# Patient Record
Sex: MALE | Race: WHITE
[De-identification: names, ages, dates, MRNs, and addresses within clinical notes are randomized per-mention and may not be internally consistent; named-entity substitution may affect disease eponyms.]

---

## 2018-02-27 ENCOUNTER — HOSPITAL ENCOUNTER (OUTPATIENT)
Dept: HOSPITAL 68 - STS | Age: 58
End: 2018-02-27
Payer: COMMERCIAL

## 2018-02-27 VITALS — HEIGHT: 72 IN | WEIGHT: 255.25 LBS | BODY MASS INDEX: 34.57 KG/M2

## 2018-02-27 DIAGNOSIS — Z79.01: ICD-10-CM

## 2018-02-27 DIAGNOSIS — Z79.84: ICD-10-CM

## 2018-02-27 DIAGNOSIS — I10: ICD-10-CM

## 2018-02-27 DIAGNOSIS — Z85.528: ICD-10-CM

## 2018-02-27 DIAGNOSIS — E11.8: ICD-10-CM

## 2018-02-27 DIAGNOSIS — H25.9: Primary | ICD-10-CM

## 2018-02-27 PROCEDURE — V2632 POST CHMBR INTRAOCULAR LENS: HCPCS

## 2018-02-27 NOTE — OPERATIVE REPORT
Operative/Inv Procedure Report
Surgery Date: 02/27/18
Name of Procedure:
Cataract extraction lens implantation left eye
Pre-Operative Diagnosis:
Age-related cataract left eye 20/50 vision
Post-Operative Diagnosis:
Same
Estimated Blood Loss: none
Surgeon/Assistant:
Darrel CORBETT,Larry CARTER
 
Anesthesia: local monitored anesthesi
Complications:
None
 
Operative/Procedure Note
Note:
The patient was brought to the operating room standard monitoring equipment was 
attached the patient was prepped and draped in the usual fashion for intraocular
surgery.  A lid speculum was placed to retract the lids.  The case was begun by 
making 2 partial-thickness corneal relaxing incisions at 75.  A temporal 
incision with a 2.4 mm keratome.  The eye was stabilized with a Mace ring 
during this incision.  1 mL of non-preserved lidocaine was introduced into the 
anterior chamber to provide anesthesia.  The anterior chamber was then filled 
and deepened with viscoelastic.  A curvilinear capsulorrhexis was achieved using
a 30-gauge needle and is a cystotome and capsulorrhexis was finished using a 
Utrata forceps.  A second or paracentesis incision was made temporally with a 1 
mm MVR blade.  The lens was then hydrodissected with balanced salt solution and 
found to be rotatable.  The lens was emulsified using phacoemulsification and a 
modified four-quadrant cracking technique.  The residual cortical material was 
removed using automated irrigation and aspiration and as much of the anterior 
capsular rim was cleaned as well as possible.  The posterior capsule was cleaned
first with the automated machine on a low setting and then manually with a Paul
squeegee.  The capsular bag was deepened with viscoelastic.  The lens a Akreos 
AO60 18.5  Diopter placed into the bag under direct visualization and rotated so
that the haptics were at 12 and 6:00.  Viscoelastic was then removed from the 
eye by flushing it out and then by automated irrigation and aspiration.  The eye
was pressurized to a normal tone.  1/10 of a cc of vancomycin solution was 
introduced into the anterior chamber to provide antibiotic prophylaxis.  The 
wounds were sealed by hydrating the stroma adjacent to them and the eye was left
at a proper tone after the wounds were checked and found not to be leaking.  The
lid speculum was removed from the orbit.  Antibiotic and steroid drops were 
placed on the eye and then the eye was shielded.  Monitoring equipment was 
removed from the patient and the patient was removed from the operative suite to
the holding area.  The patient tolerated the procedure well and will be seen in 
the office tomorrow.

## 2018-03-27 ENCOUNTER — HOSPITAL ENCOUNTER (OUTPATIENT)
Dept: HOSPITAL 68 - STS | Age: 58
End: 2018-03-27
Payer: COMMERCIAL

## 2018-03-27 VITALS — BODY MASS INDEX: 34.54 KG/M2 | WEIGHT: 255 LBS | HEIGHT: 72 IN

## 2018-03-27 DIAGNOSIS — Z79.84: ICD-10-CM

## 2018-03-27 DIAGNOSIS — I48.91: ICD-10-CM

## 2018-03-27 DIAGNOSIS — H25.9: Primary | ICD-10-CM

## 2018-03-27 DIAGNOSIS — Z79.01: ICD-10-CM

## 2018-03-27 DIAGNOSIS — E11.9: ICD-10-CM

## 2018-03-27 DIAGNOSIS — I10: ICD-10-CM

## 2018-03-27 PROCEDURE — V2632 POST CHMBR INTRAOCULAR LENS: HCPCS

## 2018-03-27 NOTE — OPERATIVE REPORT
Operative/Inv Procedure Report
Surgery Date: 03/27/18
Name of Procedure:
Cataract extraction lens implantation right eye
Pre-Operative Diagnosis:
Age-related cataract right eye 20/30 vision 20/60 glare vision
Post-Operative Diagnosis:
Same
Estimated Blood Loss: none
Surgeon/Assistant:
Darrel CORBETT,Larry CARTER
 
Anesthesia: local monitored anesthesi
Complications:
None
 
Operative/Procedure Note
Note:
The patient was brought to the operating room standard monitoring equipment was 
attached the patient was prepped and draped in the usual fashion for intraocular
surgery.  A lid speculum was placed to retract the lids.  The case was begun by 
making [2] partial-thickness corneal relaxing [incisions] at [90].  A temporal 
incision with a 2.4 mm keratome.  The eye was stabilized with a Mace ring 
during this incision.  1 mL of non-preserved lidocaine was introduced into the 
anterior chamber to provide anesthesia.  The anterior chamber was then filled 
and deepened with viscoelastic.  A curvilinear capsulorrhexis was achieved using
a 30-gauge needle and is a cystotome and capsulorrhexis was finished using a 
Utrata forceps.  A second or paracentesis incision was made temporally with a 1 
mm MVR blade.  The lens was then hydrodissected with balanced salt solution and 
found to be rotatable.  The lens was emulsified using phacoemulsification and a 
modified four-quadrant cracking technique.  The residual cortical material was 
removed using automated irrigation and aspiration and as much of the anterior 
capsular rim was cleaned as well as possible.  The posterior capsule was cleaned
first with the automated machine on a low setting and then manually with a Paul
squeegee.  The capsular bag was deepened with viscoelastic.  The lens a Akreos 
AO60 18.0  Diopter placed into the bag under direct visualization and rotated so
that the haptics were at 12 and 6:00.  Viscoelastic was then removed from the 
eye by flushing it out and then by automated irrigation and aspiration.  The eye
was pressurized to a normal tone.  1/10 of a cc of cefuroxime  solution was 
introduced into the anterior chamber to provide antibiotic prophylaxis.  The 
wounds were sealed by hydrating the stroma adjacent to them and the eye was left
at a proper tone after the wounds were checked and found not to be leaking.  The
lid speculum was removed from the orbit.  Antibiotic and steroid drops were 
placed on the eye and then the eye was shielded.  Monitoring equipment was 
removed from the patient and the patient was removed from the operative suite to
the holding area.  The patient tolerated the procedure well and will be seen in 
the office tomorrow.

## 2018-05-30 ENCOUNTER — HOSPITAL ENCOUNTER (EMERGENCY)
Dept: HOSPITAL 68 - ERH | Age: 58
End: 2018-05-30
Payer: COMMERCIAL

## 2018-05-30 VITALS — BODY MASS INDEX: 34.4 KG/M2 | WEIGHT: 254 LBS | HEIGHT: 72 IN

## 2018-05-30 VITALS — SYSTOLIC BLOOD PRESSURE: 178 MMHG | DIASTOLIC BLOOD PRESSURE: 80 MMHG
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
## 2018-05-30 DIAGNOSIS — Z79.01: ICD-10-CM
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
## 2018-05-30 DIAGNOSIS — E87.5: Primary | ICD-10-CM
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
## 2018-05-30 LAB
ABSOLUTE GRANULOCYTE CT: 4.5 /CUMM (ref 1.4–6.5)
BASOPHILS # BLD: 0 /CUMM (ref 0–0.2)
BASOPHILS NFR BLD: 0.2 % (ref 0–2)
EOSINOPHIL # BLD: 0.3 /CUMM (ref 0–0.7)
EOSINOPHIL NFR BLD: 5.2 % (ref 0–5)
ERYTHROCYTE [DISTWIDTH] IN BLOOD BY AUTOMATED COUNT: 14.6 % (ref 11.5–14.5)
GRANULOCYTES NFR BLD: 68.7 % (ref 42.2–75.2)
HCT VFR BLD CALC: 40.2 % (ref 42–52)
LYMPHOCYTES # BLD: 1.2 /CUMM (ref 1.2–3.4)
MCH RBC QN AUTO: 28.8 PG (ref 27–31)
MCHC RBC AUTO-ENTMCNC: 34 G/DL (ref 33–37)
MCV RBC AUTO: 84.9 FL (ref 80–94)
MONOCYTES # BLD: 0.5 /CUMM (ref 0.1–0.6)
PLATELET # BLD: 221 /CUMM (ref 130–400)
PMV BLD AUTO: 8.8 FL (ref 7.4–10.4)
RED BLOOD CELL CT: 4.73 /CUMM (ref 4.7–6.1)
WBC # BLD AUTO: 6.6 /CUMM (ref 4.8–10.8)

## 2018-05-30 NOTE — RADIOLOGY REPORT
EXAMINATION:
XR HAND, LEFT
 
CLINICAL INFORMATION:
Fall onto hands.
 
COMPARISON:
None
 
TECHNIQUE:
AP and lateral views of the left hand.
 
FINDINGS:
The bones and soft tissues are normal. No fracture. Alignment is anatomic.
Joint spaces are maintained. No erosions or soft tissue calcifications.
 
IMPRESSION:
Normal left hand.

## 2018-05-30 NOTE — RADIOLOGY REPORT
EXAMINATION:
XR HAND, RIGHT
 
CLINICAL INFORMATION:
Fall onto hands.
 
COMPARISON:
None
 
TECHNIQUE:
AP and lateral views of the right hand.
 
FINDINGS:
The bones and soft tissues are normal. No fracture. Alignment is anatomic.
Joint spaces are maintained. No erosions or soft tissue calcifications.
 
IMPRESSION:
Normal right hand.

## 2018-05-30 NOTE — ED MVC/FALL/TRAUMA COMPLAINT
History of Present Illness
 
General
Chief Complaint: General Adult
Stated Complaint: LEFT KNEE ABRASION
Source: patient, family, old records
Exam Limitations: no limitations
 
Vital Signs & Intake/Output
Vital Signs & Intake/Output
 Vital Signs
 
 
Date Time Temp Pulse Resp B/P B/P Pulse O2 O2 Flow FiO2
 
     Mean Ox Delivery Rate 
 
05/30 2208 96.5 56 18 178/80  100 Room Air  
 
05/30 1703 97.8 98 16 159/86  96 Room Air  
 
 
 ED Intake and Output
 
 
 05/31 0000 05/30 1200
 
Intake Total 0 
 
Output Total  
 
Balance 0 
 
   
 
Intake, Oral 0 
 
Patient 254 lb 
 
Weight  
 
 
 
Allergies
Coded Allergies:
No Known Allergies (01/10/18)
 
Reconcile Medications
Carvedilol 3.125 MG TABLET   1 TAB PO BID HEART/BP  (Reported)
Clonidine HCl 0.1 MG TABLET   1 TAB PO QPM SLEEP  (Reported)
Cyanocobalamin (Vitamin B-12) 1,000 MCG TABLET   1 TAB PO DAILY SUPPLEMENT  (
Reported)
Digoxin 250 MCG TABLET   1 TAB PO DAILY HEART  (Reported)
Ergocalciferol (Vitamin D2) (Vitamin D2) 50,000 UNIT CAPSULE   1 CAP PO Q2W 
SUPPLEMENT  (Reported)
Fenofibrate,Micronized (Fenofibrate) 134 MG CAPSULE   1 CAP PO DAILY CHOLESTEROL
/TRIGLYCERIDES  (Reported)
Fluoxetine HCl 40 MG CAPSULE   1 CAP PO DAILY MENTAL HEALTH  (Reported)
Glimepiride 2 MG TABLET   1 TAB PO BID DM  (Reported)
Losartan Potassium (Cozaar) 25 MG TABLET   1 TAB PO DAILY BP  (Reported)
Pravastatin Sodium 40 MG TABLET   1 TAB PO DAILY CHOLESTEROL  (Reported)
Sitagliptin Phosphate (Januvia) 50 MG TABLET   1 TAB PO DAILY DIABETES  (
Reported)
Tamsulosin HCl 0.4 MG CAP.ER.24H   1 CAP PO DAILY PROSTATE  (Reported)
Warfarin Sodium (Coumadin) 5 MG TABLET   1 TAB PO AD BLOOD THINNER  (Reported)
Warfarin Sodium (Coumadin) 7.5 MG TABLET   1 TAB PO WEDFRI BLOOD THINNER  (
Reported)
 
Triage Note:
58 YEAR OLD MALE , STATES THAT HE TRIPPED AND FELL
 EARLIER AND HAS ABRASION TO HIS L KNEE, PT ALSO
 COMPLAINS OF FLANK PAIN FOR ABOUT  1 WEEK AND MID
 STERNAL NON RADIATING CP FOR THE PAST WEEK, DENIES
 CP AT THIS TIME. PT IS A POOR HISTORIAN. DENIES
 PAIN TO HIS KNEE
Triage Nurses Notes Reviewed? yes
Onset: Just prior to arrival
Duration: minute(s):, constant, continues in ED
Timing: recent history
Severity: mild
Injuries/Fall Location: upper extremity, lower extremity
Method of Injury: fall
Loss of Consciousness: no loss of consciousness
No Modifying Factors: none
HPI:
Prior to admission patient lost his balance and fell onto his left knee and both
hands.  He sustained abrasion to the left knee.
He denies fever chills nausea vomiting diarrhea abdominal pain chest pain 
shortness breath headache dysuria rash other injury change in motor sensory 
function change in bowel bladder.
 
Past History
 
Travel History
Traveled to [place] past 21 day No
 
Medical History
Any Pertinent Medical History? see below for history
Neurological: MR HEALY: NONE
Cardiovascular: AFIB, hypertension, hyperlipidemia
Respiratory: asthma, COPD
Gastrointestinal: GERD
Hepatic: NONE
Renal: benign prost hyperplasia
Musculoskeletal: osteoarthritis
Psychiatric: NONE
Endocrine: diabetes
Blood Disorders: NONE
Cancer(s): NONE
GYN/Reproductive: NONE
 
Surgical History
Surgical History: appendectomy, hernia repair-incisional, tonsillectomy ear 
surgery
 
Psychosocial History
What is your primary language English
Tobacco Use: Never used
ETOH Use: denies use
Illicit Drug Use: denies illicit drug use
 
Family History
Hx Contributory? No
 
Review of Systems
 
Review of Systems
Constitutional:
Reports: no symptoms. 
Eyes:
Reports: no symptoms. 
Ears, Nose, Throat, Mouth:
Reports: no symptoms. 
Respiratory:
Reports: no symptoms. 
Cardiovascular:
Reports: no symptoms. 
Gastrointestinal/Abdominal:
Reports: no symptoms. 
Genitourinary:
Reports: no symptoms. 
Musculoskeletal:
Reports: see HPI, joint pain. 
Skin:
Reports: see HPI. 
Neurological/Psychological:
Reports: no symptoms. 
All Other Systems: Reviewed and Negative
 
Physical Exam
 
Physical Exam
General Appearance: well developed/nourished, alert, awake, anxious, obese
Head: atraumatic, normal appearance
Eyes:
Bilateral: normal appearance, PERRL, EOMI, normal inspection. 
Ears, Nose, Throat, Mouth: hearing grossly normal, moist mucous membrane
Neck: normal inspection, supple, full range of motion, normal alignment
Respiratory: normal breath sounds, chest non-tender, no respiratory distress, 
quiet respiration, lungs clear
Cardiovascular: normal peripheral pulses, irregularly irregular, norml femoral 
pulses equa
Peripheral Pulses:
4+ carotid (R), 4+ carotid (L)
Gastrointestinal: normal bowel sounds, soft, non-tender, no organomegaly
Back: normal inspection, normal range of motion, no vertebral tenderness
Extremities: normal range of motion, no ligament instability, Abrasion left knee
not actively bleeding
Neurologic/Psych: no motor/sensory deficits, awake, alert, oriented x 3, normal 
gait, normal mood/affect, CNs II-XII nml as tested
Skin: normal color
 
Core Measures
ACS in differential dx? No
CVA/TIA Diagnosis No
Sepsis Present: No
Sepsis Focused Exam Completed? No
 
Progress
Differential Diagnosis: ext injury
Plan of Care:
 Orders
 
 
Procedure Date/time Status
 
URINALYSIS 05/30 1707 Complete
 
TROPONIN LEVEL 05/30 1707 Complete
 
COMPREHENSIVE METABOLIC PANEL 05/30 1707 Complete
 
CBC WITHOUT DIFFERENTIAL 05/30 1707 Complete
 
EKG 05/30 1656 Active
 
 
 Laboratory Tests
 
 
 
05/30/18 1717:
Urine Color YEL, Urine Clarity CLDY  H, Urine pH 6.0, Ur Specific Gravity 1.025,
Urine Protein 100  H, Urine Ketones NEG, Urine Nitrite POS  H, Urine Bilirubin 
NEG, Urine Urobilinogen 0.2, Ur Leukocyte Esterase TRACE  H, Ur Microscopic 
SEDIMENT EXAMINED, Urine RBC 3-5, Urine WBC 5-10  H, Ur Epithelial Cells RARE, 
Urine Bacteria MANY  H, Urine Hemoglobin SMALL  H, Urine Glucose NEG
 
05/30/18 1710:
Anion Gap 8, Estimated GFR 32  L, BUN/Creatinine Ratio 21.4, Glucose 195  H, 
Calcium 9.2, Total Bilirubin 0.5, AST 26, ALT 41, Alkaline Phosphatase 62, 
Troponin I 0.02, Total Protein 6.1  L, Albumin 3.5, Globulin 2.6, Albumin/
Globulin Ratio 1.3, CBC w Diff NO MAN DIFF REQ, RBC 4.73, MCV 84.9, MCH 28.8, 
MCHC 34.0, RDW 14.6  H, MPV 8.8, Gran % 68.7, Lymphocytes % 18.6  L, Monocytes %
7.3, Eosinophils % 5.2  H, Basophils % 0.2, Absolute Granulocytes 4.5, Absolute 
Lymphocytes 1.2, Absolute Monocytes 0.5, Absolute Eosinophils 0.3, Absolute 
Basophils 0
 
Diagnostic Imaging:
Viewed by Me: Radiology Read.  Discussed w/RAD: Radiology Read. 
Radiology Impression: Small-to-moderate knee joint effusion without demonstrable
acute fracture. Chondrocalcinosis., Normal right hand., Normal left hand.
 
Departure
 
Departure
Time of Disposition: 2211
Disposition: HOME OR SELF CARE
Condition: Stable
Clinical Impression
Primary Impression: Contusion of knee, left
Secondary Impressions: Hyperkalemia
Referrals:
Ani CORBETT,Duane BANKS (PCP/Family)
 
Departure Forms:
Customer Survey
General Discharge Information

## 2018-05-30 NOTE — RADIOLOGY REPORT
EXAMINATION:
LEFT KNEE 3 VIEWS
 
CLINICAL INFORMATION:
Left knee pain after fall.
 
COMPARISON:
None.
 
TECHNIQUE:
AP, lateral, oblique views of the left knee were obtained.
 
FINDINGS:
There are no fractures or dislocations. There is narrowing to the
patellofemoral compartment with spurring. There is mild narrowing to the
lateral compartment. There is a loose body present within the joint space.
There is a small-to-moderate knee joint effusion. There is medial and lateral
compartment chondrocalcinosis. There is no significant soft tissue swelling.
 
IMPRESSION:
 
Small-to-moderate knee joint effusion without demonstrable acute fracture.
 
Chondrocalcinosis.